# Patient Record
Sex: MALE | Race: WHITE | NOT HISPANIC OR LATINO | URBAN - METROPOLITAN AREA
[De-identification: names, ages, dates, MRNs, and addresses within clinical notes are randomized per-mention and may not be internally consistent; named-entity substitution may affect disease eponyms.]

---

## 2019-06-19 ENCOUNTER — THERAPY VISIT (OUTPATIENT)
Dept: PHYSICAL THERAPY | Facility: CLINIC | Age: 84
End: 2019-06-19
Payer: COMMERCIAL

## 2019-06-19 DIAGNOSIS — M25.561 ACUTE PAIN OF RIGHT KNEE: ICD-10-CM

## 2019-06-19 PROCEDURE — 97110 THERAPEUTIC EXERCISES: CPT | Mod: GP | Performed by: PHYSICAL THERAPIST

## 2019-06-19 PROCEDURE — 97161 PT EVAL LOW COMPLEX 20 MIN: CPT | Mod: GP | Performed by: PHYSICAL THERAPIST

## 2019-06-19 ASSESSMENT — ACTIVITIES OF DAILY LIVING (ADL)
AS_A_RESULT_OF_YOUR_KNEE_INJURY,_HOW_WOULD_YOU_RATE_YOUR_CURRENT_LEVEL_OF_DAILY_ACTIVITY?: NEARLY NORMAL
SQUAT: ACTIVITY IS SOMEWHAT DIFFICULT
RISE FROM A CHAIR: ACTIVITY IS SOMEWHAT DIFFICULT
WEAKNESS: THE SYMPTOM AFFECTS MY ACTIVITY SLIGHTLY
STAND: ACTIVITY IS SOMEWHAT DIFFICULT
KNEE_ACTIVITY_OF_DAILY_LIVING_SUM: 51
SWELLING: I DO NOT HAVE THE SYMPTOM
RAW_SCORE: 51
PAIN: I DO NOT HAVE THE SYMPTOM
HOW_WOULD_YOU_RATE_THE_CURRENT_FUNCTION_OF_YOUR_KNEE_DURING_YOUR_USUAL_DAILY_ACTIVITIES_ON_A_SCALE_FROM_0_TO_100_WITH_100_BEING_YOUR_LEVEL_OF_KNEE_FUNCTION_PRIOR_TO_YOUR_INJURY_AND_0_BEING_THE_INABILITY_TO_PERFORM_ANY_OF_YOUR_USUAL_DAILY_ACTIVITIES?: 40
WALK: ACTIVITY IS SOMEWHAT DIFFICULT
HOW_WOULD_YOU_RATE_THE_OVERALL_FUNCTION_OF_YOUR_KNEE_DURING_YOUR_USUAL_DAILY_ACTIVITIES?: ABNORMAL
GO UP STAIRS: ACTIVITY IS SOMEWHAT DIFFICULT
SIT WITH YOUR KNEE BENT: ACTIVITY IS NOT DIFFICULT
STIFFNESS: I DO NOT HAVE THE SYMPTOM
KNEEL ON THE FRONT OF YOUR KNEE: ACTIVITY IS NOT DIFFICULT
KNEE_ACTIVITY_OF_DAILY_LIVING_SCORE: 72.86
GIVING WAY, BUCKLING OR SHIFTING OF KNEE: THE SYMPTOM AFFECTS MY ACTIVITY SLIGHTLY
GO DOWN STAIRS: ACTIVITY IS FAIRLY DIFFICULT
LIMPING: THE SYMPTOM AFFECTS MY ACTIVITY SLIGHTLY

## 2019-06-19 NOTE — LETTER
[facility] ATHLETIC [facility] PHYSICAL THERAPY  [address]  [facility] [address]  [phone]    2019    Re: Americo Barnes   :   5/15/1927  MRN:  [id number]   REFERRING PHYSICIAN:   Micah Bellamy    [facility] ATHLETIC [facility] PHYSICAL THERAPY    Date of Initial Evaluation:  2019  Visits:   1  Reason for Referral:  Acute pain of right knee    [facility] Initial Evaluation -- Lower Extremity  Evaluation Date: 2019  Americo Barnes is a 89 year old male with a Lt Knee condition.   Referral: Ortho  Work mechanical stresses: NA   Employment status: Retired  Leisure mechanical stresses: Walking daily (30 min)  Functional disability score: See KOS  VAS score (0-10): 1-2/10  Patient goals/expectations:  Reduce pain and knee buckling with walking    HISTORY:  Present symptoms: (R) ant knee  Pain quality (sharp/shooting/stabbing/aching/burning/cramping):  Achy   Present since (onset date): 19    Symptoms (improving/unchaning/worsening):  Unchanging.    Symptoms commenced as a result of:  [place]   Condition occurred in the following environment: Community   Symptoms at onset: as above    Paresthesia (yes/no):  No  Spinal history: No     Cough/Sneeze (pos/neg):  Neg  Constant symptoms: None  Intermittent symptoms: As above  Symptoms are worse with the following: Always Walking, Sometimes rising from chair and first steps, Time of day - No effect  Symptoms are better with the following: Always When still  Continued use makes the pain (better/worse/no effect): No effect  Disturbed night (yes/no): No    Pain at rest (yes/no):  No    Site (back/hip/knee/ankle/foot):    Other questions (swelling/clicking/locking/giving way/falling):  buckling  Previous episodes: No  Previous treatments: None      Re: Americo Barnes   :   5/15/1927    Specific Questions:  General health  (excellent/good/fair/poor):  Excellent  Pertinent medical history includes: None  Medications (nil/NSAIDS/analg/steroids/anticoag/other):  Anticoag  Medical allergies:  None  Imaging (none/Xray/MRI/other):  X-ray  Recent or major surgery (yes/no):  None  Night pain (yes/no):  No  Accidents (yes/no):  No  Unexplained weight loss (yes/no):  No  Barriers at home: None  Other red flags: None    Sites for physical examination (back/hip/knee/ankle/foot/other): Knee    EXAMINATION    Posture:  Sitting (good/fair/poor): NT    Correction of Posture (better/worse/no effect/NA): NT  Standing (good/fair/poor): NT  Other observations:  NT  Neurological: (NA/motor/sensory/reflexes/dural): NT  Baselines (pain or functional activity): Pain/buckling with walking, sometimes rising and first steps  Extremities (Hip / Knee / Ankle / Foot): Knee  Movement Loss Phillip Mod Min Nil Pain   Flexion    X 133 deg (B); ERP Rt knee   Extension   X  Rt 2 deg lacking  Lt 0 deg   Passive Movement (+/- over pressure)/(PDM/ERP):  NT  Resisted Test Response (pain): Quad Rt 4+/5 Lt 5/5; H-S 5/5 (B)  Other Tests: NT    Spine:  Movement loss: NT  Effect of repeated movements: NT  Effect of static positioning: NT  Spine testing (not relevant/relevant/secondary problem): Not relevant                      Re: Americo Barnes   :   5/15/1927    Baseline Symptoms: ROM/strength as above; 2/10 pain squatting  Repeated Tests Symptom Response Mechanical Response   Active/Passive movement, resisted test, functional test During -  Produce, Abolish, Increase, Decrease, NE After -  Better, Worse, NB, NW, NE Effect -   ? or ? ROM, strength or key functional test No   Effect   Knee ext w/self OP (Sitting) Produce  Posterior knee  No Worse    Inc Ext ROM to 1 deg hyper    Inc Quad strength    NE squat    LAQ No Effect    No Effect    Dec pain squat    Effect of static positioning       NT       Provisional Classification (Extremity/Spine):  Extremity - Derangement     Princicple of Management:   Education:  Specificity of exercise, rationale with repeated movements    Equipment provided:  None  Exercise and dosage:  Alt between repeated knee ext w/self OP and LAQ x 10-15 reps every 2 hrs    ASSESSMENT/PLAN:  Patient is a 89 year old male with right side knee complaints.    Patient has the following significant findings with corresponding treatment plan.                Diagnosis 1:  Rt Knee Pain  Pain -  self management, education, directional preference exercise and home program  Decreased ROM/flexibility - manual therapy, therapeutic exercise and home program  Decreased joint mobility - manual therapy, therapeutic exercise and home program  Decreased strength - therapeutic exercise, therapeutic activities and home program  Impaired muscle performance - neuro re-education and home program  Decreased function - therapeutic activities and home program  Previous and current functional limitations:  (See Goal Flow Sheet for this information)    Short term and Long term goals: (See Goal Flow Sheet for this information)   Communication ability:  Patient appears to be able to clearly communicate and understand verbal and written communication and follow directions correctly.  Treatment Explanation - The following has been discussed with the patient:   RX ordered/plan of care, Anticipated outcomes, Possible risks and side effects      Re: Americo Barnes   :   5/15/1927    This patient would benefit from PT intervention to resume normal activities.   Rehab potential is good.  Frequency:  1 X week, once daily  Duration:  for 6 weeks  Discharge Plan:  Achieve all LTG.  Independent in home treatment program.  Reach maximal therapeutic benefit.    Thank you for your referral.    INQUIRIES        Therapist:   MANI MonteroT, Cert. MDT   [facility]  [address] [facility] [address]   [place] [address]  Phone: [phone]  Fax: [phone]

## 2019-06-19 NOTE — PROGRESS NOTES
[facility] Initial Evaluation -- Lower Extremity    Evaluation Date: June 19, 2019  Americo Barnes is a 89 year old male with a Lt Knee condition.   Referral: Ortho  Work mechanical stresses: NA   Employment status: Retired  Leisure mechanical stresses: Walking daily (30 min)  Functional disability score: See KOS  VAS score (0-10): 1-2/10  Patient goals/expectations:  Reduce pain and knee buckling with walking    HISTORY:    Present symptoms: (R) ant knee  Pain quality (sharp/shooting/stabbing/aching/burning/cramping):  Achy     Present since (onset date): 6/13/19    Symptoms (improving/unchaning/worsening):  Unchanging.      Symptoms commenced as a result of:  [place]   Condition occurred in the following environment: Community     Symptoms at onset: as above  Paresthesia (yes/no):  No  Spinal history: No   Cough/Sneeze (pos/neg):  Neg    Constant symptoms: None  Intermittent symptoms: As above    Symptoms are worse with the following: Always Walking, Sometimes rising from chair and first steps, Time of day - No effect  Symptoms are better with the following: Always When still    Continued use makes the pain (better/worse/no effect): No effect    Disturbed night (yes/no): No      Pain at rest (yes/no):  No  Site (back/hip/knee/ankle/foot):      Other questions (swelling/clicking/locking/giving way/falling):  buckling     Previous episodes: No  Previous treatments: None    Specific Questions:  General health (excellent/good/fair/poor):  Excellent  Pertinent medical history includes: None  Medications (nil/NSAIDS/analg/steroids/anticoag/other):  Anticoag  Medical allergies:  None  Imaging (none/Xray/MRI/other):  X-ray  Recent or major surgery (yes/no):  None  Night pain (yes/no):  No  Accidents (yes/no):  No  Unexplained weight loss (yes/no):  No  Barriers at home: None  Other red flags: None    Sites for physical examination (back/hip/knee/ankle/foot/other):  Knee    EXAMINATION    Posture:  Sitting (good/fair/poor): NT    Correction of Posture (better/worse/no effect/NA): NT  Standing (good/fair/poor): NT  Other observations:  NT    Neurological: (NA/motor/sensory/reflexes/dural): NT    Baselines (pain or functional activity): Pain/buckling with walking, sometimes rising and first steps    Extremities (Hip / Knee / Ankle / Foot): Knee    Movement Loss Phillip Mod Min Nil Pain   Flexion    X 133 deg (B); ERP Rt knee   Extension   X  Rt 2 deg lacking  Lt 0 deg     Passive Movement (+/- over pressure)/(PDM/ERP):  NT  Resisted Test Response (pain): Quad Rt 4+/5 Lt 5/5; H-S 5/5 (B)  Other Tests: NT    Spine:  Movement loss: NT  Effect of repeated movements: NT  Effect of static positioning: NT  Spine testing (not relevant/relevant/secondary problem): Not relevant    Baseline Symptoms: ROM/strength as above; 2/10 pain squatting  Repeated Tests Symptom Response Mechanical Response   Active/Passive movement, resisted test, functional test During -  Produce, Abolish, Increase, Decrease, NE After -  Better, Worse, NB, NW, NE Effect -   ? or ? ROM, strength or key functional test No   Effect   Knee ext w/self OP (Sitting) Produce  Posterior knee  No Worse    Inc Ext ROM to 1 deg hyper    Inc Quad strength    NE squat    LAQ No Effect    No Effect    Dec pain squat    Effect of static positioning       NT         Provisional Classification (Extremity/Spine):  Extremity - Derangement      Princicple of Management:   Education:  Specificity of exercise, rationale with repeated movements    Equipment provided:  None  Exercise and dosage:  Alt between repeated knee ext w/self OP and LAQ x 10-15 reps every 2 hrs    ASSESSMENT/PLAN:    Patient is a 89 year old male with right side knee complaints.    Patient has the following significant findings with corresponding treatment plan.                Diagnosis 1:  Rt Knee Pain    Pain -  self management, education, directional preference exercise  and home program  Decreased ROM/flexibility - manual therapy, therapeutic exercise and home program  Decreased joint mobility - manual therapy, therapeutic exercise and home program  Decreased strength - therapeutic exercise, therapeutic activities and home program  Impaired muscle performance - neuro re-education and home program  Decreased function - therapeutic activities and home program    Previous and current functional limitations:  (See Goal Flow Sheet for this information)    Short term and Long term goals: (See Goal Flow Sheet for this information)     Communication ability:  Patient appears to be able to clearly communicate and understand verbal and written communication and follow directions correctly.  Treatment Explanation - The following has been discussed with the patient:   RX ordered/plan of care  Anticipated outcomes  Possible risks and side effects  This patient would benefit from PT intervention to resume normal activities.   Rehab potential is good.    Frequency:  1 X week, once daily  Duration:  for 6 weeks  Discharge Plan:  Achieve all LTG.  Independent in home treatment program.  Reach maximal therapeutic benefit.    Please refer to the daily flowsheet for treatment today, total treatment time and time spent performing 1:1 timed codes.

## 2019-06-26 ENCOUNTER — THERAPY VISIT (OUTPATIENT)
Dept: PHYSICAL THERAPY | Facility: CLINIC | Age: 84
End: 2019-06-26
Payer: COMMERCIAL

## 2019-06-26 DIAGNOSIS — M25.561 ACUTE PAIN OF RIGHT KNEE: ICD-10-CM

## 2019-06-26 PROCEDURE — 97110 THERAPEUTIC EXERCISES: CPT | Mod: GP | Performed by: PHYSICAL THERAPIST

## 2019-06-26 NOTE — PROGRESS NOTES
DISCHARGE REPORT    Progress reporting period is from 6.19.19 to 6.26.19.       SUBJECTIVE  Subjective changes noted by patient:  Pt reports that knee is doing well.  Has not been having any pain or buckling since last visit.  Has been doing his regular walking without issues.  Is doing HEP ~ 3-4 x daily.    Current Pain level: 0/10.     Initial Pain level: 2/10.   Changes in function:  Yes (See Goal flowsheet attached for changes in current functional level)  Adverse reaction to treatment or activity: None    OBJECTIVE  Objective: ROM:  Rt Knee 2-0-135 deg.  Strength:  Quad 5/5; H-S 5/5.      ASSESSMENT/PLAN  Updated problem list and treatment plan:   Diagnosis 1:  Rt Knee Pain    Decreased ROM/flexibility - therapeutic exercise and home program  Decreased joint mobility - therapeutic exercise and home program  Decreased strength - therapeutic exercise and home program  Impaired muscle performance - home program  STG/LTGs have been met or progress has been made towards goals:  Yes (See Goal flow sheet completed today.)  Assessment of Progress: The patient's condition is improving.  The patient has met all of their long term goals.  Self Management Plans:  Patient is independent in a home treatment program.  Patient is independent in self management of symptoms.  I have re-evaluated this patient and find that the nature, scope, duration and intensity of the therapy is appropriate for the medical condition of the patient.  Americo continues to require the following intervention to meet STG and LTG's:  PT intervention is no longer required to meet STG/LTG.    Recommendations:  This patient is ready to be discharged from therapy and continue their home treatment program.

## 2019-06-26 NOTE — LETTER
[facility] ATHLETIC [facility]  [address]   [place] [address]  [phone]    2019    Re: Americo Barnes   :   5/15/1927  MRN:  [id number]   REFERRING PHYSICIAN:   Micah Bellamy    [facility] ATHLETIC [facility]  Date of Initial Evaluation:  2019  Visits:  Rxs Used: 2  Reason for Referral:  Acute pain of right knee    DISCHARGE REPORT  Progress reporting period is from 19 to 19.       SUBJECTIVE  Subjective changes noted by patient:  Pt reports that knee is doing well.  Has not been having any pain or buckling since last visit.  Has been doing his regular walking without issues.  Is doing HEP ~ 3-4 x daily.    Current Pain level: 0/10.     Initial Pain level: 2/10.   Changes in function:  Yes (See Goal flowsheet attached for changes in current functional level)  Adverse reaction to treatment or activity: None    OBJECTIVE  Objective: ROM:  Rt Knee 2-0-135 deg.  Strength:  Quad 5/5; H-S 5/5.    ASSESSMENT/PLAN  Updated problem list and treatment plan:   Diagnosis 1:  Rt Knee Pain    Decreased ROM/flexibility - therapeutic exercise and home program  Decreased joint mobility - therapeutic exercise and home program  Decreased strength - therapeutic exercise and home program  Impaired muscle performance - home program  STG/LTGs have been met or progress has been made towards goals:  Yes (See Goal flow sheet completed today.)  Assessment of Progress: The patient's condition is improving.  The patient has met all of their long term goals.  Self Management Plans:  Patient is independent in a home treatment program.  Patient is independent in self management of symptoms.  I have re-evaluated this patient and find that the nature, scope, duration and intensity of the therapy is appropriate for the medical condition of the patient.  Americo continues to require the following intervention to meet STG and LTG's:  PT  intervention is no longer required to meet STG/LTG.          Re: Americo OLI Cameron   :   5/15/1927    Recommendations:  This patient is ready to be discharged from therapy and continue their home treatment program.    Thank you for your referral.    INQUIRIES  Therapist: Beau Smith, PT  [facility]  [address]  [place] [address]  Phone: [phone]  Fax: [phone]

## 2020-12-19 ENCOUNTER — RECORDS - HEALTHEAST (OUTPATIENT)
Dept: LAB | Facility: CLINIC | Age: 85
End: 2020-12-19

## 2020-12-21 ENCOUNTER — OFFICE VISIT - HEALTHEAST (OUTPATIENT)
Dept: GERIATRICS | Facility: CLINIC | Age: 85
End: 2020-12-21

## 2020-12-21 ENCOUNTER — AMBULATORY - HEALTHEAST (OUTPATIENT)
Dept: GERIATRICS | Facility: CLINIC | Age: 85
End: 2020-12-21

## 2020-12-21 ENCOUNTER — RECORDS - HEALTHEAST (OUTPATIENT)
Dept: LAB | Facility: CLINIC | Age: 85
End: 2020-12-21

## 2020-12-21 ENCOUNTER — COMMUNICATION - HEALTHEAST (OUTPATIENT)
Dept: GERIATRICS | Facility: CLINIC | Age: 85
End: 2020-12-21

## 2020-12-21 DIAGNOSIS — J47.9 BRONCHIECTASIS WITHOUT COMPLICATION (H): ICD-10-CM

## 2020-12-21 DIAGNOSIS — H35.30 AMD (AGE RELATED MACULAR DEGENERATION): ICD-10-CM

## 2020-12-21 DIAGNOSIS — U07.1 2019 NOVEL CORONAVIRUS DISEASE (COVID-19): ICD-10-CM

## 2020-12-21 LAB — INR PPP: 3.76 (ref 0.9–1.1)

## 2020-12-21 RX ORDER — MULTIPLE VITAMINS W/ MINERALS TAB 9MG-400MCG
1 TAB ORAL DAILY
Status: SHIPPED | COMMUNITY
Start: 2020-12-21

## 2020-12-21 RX ORDER — ZINC SULFATE 50(220)MG
220 CAPSULE ORAL DAILY
Status: SHIPPED | COMMUNITY
Start: 2020-12-21

## 2020-12-21 RX ORDER — ALBUTEROL SULFATE 90 UG/1
2 AEROSOL, METERED RESPIRATORY (INHALATION) 4 TIMES DAILY
Status: SHIPPED | COMMUNITY
Start: 2020-12-21

## 2020-12-22 LAB — INR PPP: 3.48 (ref 0.9–1.1)

## 2020-12-23 ENCOUNTER — OFFICE VISIT - HEALTHEAST (OUTPATIENT)
Dept: GERIATRICS | Facility: CLINIC | Age: 85
End: 2020-12-23
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
## 2020-12-23 DIAGNOSIS — U07.1 PNEUMONIA DUE TO COVID-19 VIRUS: ICD-10-CM

## 2020-12-23 DIAGNOSIS — J12.82 PNEUMONIA DUE TO COVID-19 VIRUS: ICD-10-CM

## 2020-12-23 DIAGNOSIS — J96.01 ACUTE RESPIRATORY FAILURE WITH HYPOXIA (H): ICD-10-CM

## 2020-12-23 ASSESSMENT — MIFFLIN-ST. JEOR: SCORE: 1442.32

## 2020-12-28 ENCOUNTER — OFFICE VISIT - HEALTHEAST (OUTPATIENT)
Dept: GERIATRICS | Facility: CLINIC | Age: 85
End: 2020-12-28

## 2020-12-28 DIAGNOSIS — U07.1 PNEUMONIA DUE TO COVID-19 VIRUS: ICD-10-CM

## 2020-12-28 DIAGNOSIS — J12.82 PNEUMONIA DUE TO COVID-19 VIRUS: ICD-10-CM

## 2020-12-28 DIAGNOSIS — U07.1 2019 NOVEL CORONAVIRUS DISEASE (COVID-19): ICD-10-CM

## 2020-12-28 DIAGNOSIS — J96.01 ACUTE RESPIRATORY FAILURE WITH HYPOXIA (H): ICD-10-CM

## 2020-12-30 ENCOUNTER — OFFICE VISIT - HEALTHEAST (OUTPATIENT)
Dept: GERIATRICS | Facility: CLINIC | Age: 85
End: 2020-12-30

## 2020-12-30 DIAGNOSIS — J96.01 ACUTE RESPIRATORY FAILURE WITH HYPOXIA (H): ICD-10-CM

## 2020-12-30 DIAGNOSIS — U07.1 PNEUMONIA DUE TO COVID-19 VIRUS: ICD-10-CM

## 2020-12-30 DIAGNOSIS — J12.82 PNEUMONIA DUE TO COVID-19 VIRUS: ICD-10-CM

## 2020-12-30 ASSESSMENT — MIFFLIN-ST. JEOR: SCORE: 1442.32

## 2021-01-04 ENCOUNTER — AMBULATORY - HEALTHEAST (OUTPATIENT)
Dept: GERIATRICS | Facility: CLINIC | Age: 86
End: 2021-01-04

## 2021-06-05 VITALS
BODY MASS INDEX: 26.51 KG/M2 | DIASTOLIC BLOOD PRESSURE: 67 MMHG | OXYGEN SATURATION: 96 % | HEART RATE: 74 BPM | HEIGHT: 69 IN | TEMPERATURE: 98.8 F | WEIGHT: 179 LBS | SYSTOLIC BLOOD PRESSURE: 121 MMHG | RESPIRATION RATE: 20 BRPM

## 2021-06-05 VITALS
OXYGEN SATURATION: 93 % | HEIGHT: 69 IN | DIASTOLIC BLOOD PRESSURE: 80 MMHG | RESPIRATION RATE: 18 BRPM | WEIGHT: 179 LBS | HEART RATE: 66 BPM | SYSTOLIC BLOOD PRESSURE: 142 MMHG | BODY MASS INDEX: 26.51 KG/M2 | TEMPERATURE: 97.8 F

## 2021-06-14 NOTE — PROGRESS NOTES
[facility]    Facility:   [facility] [[id number]]   Code Status: POLST AVAILABLE      CHIEF COMPLAINT/REASON FOR VISIT:  Chief Complaint   Patient presents with     Review Of Multiple Medical Conditions     hypoxia, covid-19, fall, weakness, long term use of anticoagulants, pneumonia due to covid 19, DVT       HISTORY:      HPI: Eliu is a 89 y.o. 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 Americo, as he likes to be called, was recently admitted to [facility] from 12/15/2020 until 12/19/2020 for pneumonia due to COVID-19, acute hypoxemia secondary to COVID-19, falls, generalized weakness.  The discharging provider summarized the hospitalization stating that Mr. Barnes tested positive for COVID-19 on 12/14 with symptoms starting on 12/12.  He had superimposed pneumonia, CHF, PE.  Infectious diseases placed him on remdesivir, he started dexamethasone and 4 times daily albuterol on 12/15.  He was started on oxygen as  needed to keep saturation greater than 90% and was requiring 1 to 2 L of oxygen while in the hospital.  He was stabilized and transferred to [facility] for acute rehabilitation.    Today Mr. Barnes is being evaluated for a routine review of multiple medical problems while in the TCU.  He has been continuing to use oxygen but nurses admit that they have not really attempted to wean at this time.  Did discuss weaning oxygen as much as possible so that he can be at baseline prior to discharge.  He states that therapies are going well.  He states that he just wants to go home.  He states that he feels he is strong enough and ready to discharge.  Did discuss that we would likely keep him for a bit longer so as to ensure that he is not on oxygen and that he truly is improving.  He is agreeable to this care plan.  He states that he has no new concerns or issues to report.  He states that he has no aches or pains.  Nursing staff feel that he has been stable.  Americo denies any other concerns including fevers/chills, cough or cold symptoms, headaches, vision changes, chest pain/pressure, difficulty breathing, SOB, abdominal pain, nausea, vomiting, diarrhea, dysuria, increasing weakness, increasing pain.     Past Medical History:   Diagnosis Date     Adenomatous polyp of colon 10/30/2018     After-cataract obscuring vision 08/29/2014     Bronchiectasis without complication (H) 9/6/2018     Chorioretinal scar 8/10/2010    Epic     Congenital pes planus 1/17/2006     Disorder of bursae and tendons in shoulder region 11/29/2005    Epic     DVT (deep vein thrombosis) in pregnancy      DVT (deep venous thrombosis) (H) 11/24/2009     Epiretinal membrane (ERM) of left eye 12/22/2015     Eversion of lacrimal punctum 1/23/2009    Epic     Factor V Leiden (H) 12/21/2020     Fibula fracture 2/14/2008     Hypertropia of right eye 12/7/2017     Lamellar macular hole of left eye 12/22/2015     Lung nodule 9/25/2007     Pneumonia  due to COVID-19 virus 12/19/2020     Post herpetic neuralgia 4/30/2018     Prostate cancer (H) 12/21/2020     Pseudophakia, both eyes 7/22/2010    OD 7/12/10  OS 7/26/10  Dr. Hadley Hancock     Raynaud's phenomenon 1/17/2006     Senile nuclear sclerosis 01/23/2009     Sprained ankle 2/5/2008     Superior oblique palsy, right 3/28/2018     Warfarin anticoagulation 11/25/2009    Diagnoses: DVT x2, hx Factor V Leiden Therapeutic range: 2 - 3.  Warfarin pill strength: 5mg.  Initiation date ( AKA date when started on warfarin ): 11/24/2009  On enoxaparin: Yes, untill INR is theraputic  Length of treatment: long-term.  Comments:   Nikhil Howe             Family History   Problem Relation Age of Onset     Breast cancer Mother      Cancer Father      Colon cancer Brother      Prostate cancer Brother      Social History     Socioeconomic History     Marital status:      Spouse name: Not on file     Number of children: Not on file     Years of education: Not on file     Highest education level: Not on file   Occupational History     Not on file   Social Needs     Financial resource strain: Not on file     Food insecurity     Worry: Not on file     Inability: Not on file     Transportation needs     Medical: Not on file     Non-medical: Not on file   Tobacco Use     Smoking status: Never Smoker   Substance and Sexual Activity     Alcohol use: Not Currently     Frequency: Never     Comment: occasional     Drug use: Not on file     Sexual activity: Not on file   Lifestyle     Physical activity     Days per week: Not on file     Minutes per session: Not on file     Stress: Not on file   Relationships     Social connections     Talks on phone: Not on file     Gets together: Not on file     Attends Jew service: Not on file     Active member of club or organization: Not on file     Attends meetings of clubs or organizations: Not on file     Relationship status: Not on file     Intimate partner violence     Fear of  "current or ex partner: Not on file     Emotionally abused: Not on file     Physically abused: Not on file     Forced sexual activity: Not on file   Other Topics Concern     Incontinent Not Asked     Toileting independently Not Asked     Cognitive impairment Not Asked     Vision impairment Not Asked     Hearing impairment Not Asked     Dentures Not Asked   Social History Narrative     Not on file       REVIEW OF SYSTEM:  Per HPI    PHYSICAL EXAM:   /67   Pulse 74   Temp 98.8  F (37.1  C)   Resp 20   Ht 5' 9\" (1.753 m)   Wt 179 lb (81.2 kg)   SpO2 96%   BMI 26.43 kg/m      A limited exam was performed due to recommendations for care during COVID-19 pandemic. Due to the 2020 COVID-19 pandemic, except as noted the patient was visually observed at a 6 foot plus distance (so for billing this means that if the physical exam requires touch such as a cardiovascular exam or more complete respiratory exam this would be the \"exception\").  A mostly or purely observational exam depending on the charting below was performed in an effort to keep patient safe from COVID-19 and other communicable diseases.     General appearance: alert, appears stated age and cooperative  HEENT: Head is normocephalic with normal hair distribution. No evidence of trauma. Ears: Without lesions or deformity. No acute purulent discharge. Eyes: Conjunctivae pink with no scleral icterus or erythema. Nose: Normal. Oropharnyx: mmm.  Lungs: respirations without effort, using oxygen.  Extremities: extremities normal, atraumatic, no cyanosis.  Skin: Skin color, texture normal. No rashes or lesions on exposed skin.   Neurologic: Grossly normal   Psych: interacts well with caregivers, exhibits logical thought processes and connections, pleasant.      LABS:   None today.     ASSESSMENT:      ICD-10-CM    1. Pneumonia due to COVID-19 virus  U07.1     J12.89    2. Acute respiratory failure with hypoxia (H)  J96.01    3. Fall, subsequent encounter  " W19.XXXD        PLAN:    COVID-19 Infection  -COVID-19 PCR positive.   -Albuterol 2 puffs with chamber every 4 hours as needed for shortness of breath or wheeze.   -Eliquis 5 mg by mouth two times a day (this is ongoing d  -Tylenol 650 mg by mouth four times a day for fever, pain as needed.   -Vitamin D3 5000 iu by mouth daily.   -Zince 220 mg by mouth daily.   -Oxygen as needed, plan to wean as much as possible.  -Quarantine.   -Follow very closely due to serious nature of disease and patient's advanced age and comorbidities.    Physical Deconditioning  -Continue PT/OT and other therapies as per care plan.  -Encouraged good nutrition and movement habits.   -Discussed care plan and expected course of stay.   -Continue to follow-up per routine schedule or sooner if needed.     Otherwise continue current care plan for all other chronic medical conditions, as they are stable. Encouraged patient to engage in healthy lifestyle behaviors such as engaging in social activities, exercising (PT/OT), eating well, and following care plan. Follow up for routine check-up, or sooner if needed. Will continue to monitor patient and work with nursing staff collaboratively to work toward positive patient outcomes.    Electronically signed by: Xiomara Fonseca CNP

## 2021-06-14 NOTE — PROGRESS NOTES
[facility]    Facility:   [facility] [[id number]]   Code Status: POLST AVAILABLE    Admission evaluation to TCU quarantine unit of 89-year-old male.  History is taken from hospital notes, patient is able to provide minimal history.  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    Past medical history, current medical problem list, drug allergies, current medication list, social history, family history, CODE STATUS reviewed in epic.    Review of systems: Continued sense of dyspnea, moderate cough.  No sputum production.  Denies fever sweats or chills.  Poor appetite continues.  No nausea or vomiting.  No active GI or  symptoms.  Irritable.  No pleuritic chest discomfort.  Remainder of 12 point review of systems obtained negative.    Exam: Sitting in chair, hard of hearing, participates in history taking.  Irritable.  Blood pressure 121/67, heart rate 74, temperature 98.8, O2 96% on 2 L.  No facial asymmetry.  Mucous membranes moist.  No use of accessory muscles at rest.  S1 and S2 regular.  Pulmonary exam with diminished air movement bases, no rales or wheezes.  Periphery with trace nonpitting edema, venous stasis changes present.  Muscle tone diminished.    Impression and plan:   COVID-19 with pneumonia post remdesivir and dexamethasone, continues to require supplemental O2,  continue to observe, inhalers, quarantine to continue, afebrile.    Significant deconditioning with need for rehabilitation.    Vision deficit with macular degeneration.    Chronic DVT on Coumadin to be continued.    Recent ER evaluation with cholelithiasis, asymptomatic, no evidence of acute abdomen.    Chronic bronchiectasis, not on oxygen in home setting.    Severe malnutrition with need for nutritional support.    CT findings of thoracic fracture subacute, no current complaints of pain.    Medical records are reviewed, review of medications, discussion with patient and nursing staff, clinical review of patient.      Electronically signed by: Jeanie Montez MD

## 2021-06-14 NOTE — TELEPHONE ENCOUNTER
[facility] Nurse Triage Anticoagulation Note      Provider: JAYANT Josue  Facility: [facility]    Facility Type: TCU    Caller: Rhonda  Call Back Number:  [phone]    Reason for call: INR    Today s INR: 3.76  Previous INR:  Unknown, new admit from [facility].    Diagnosis/Goal: DVT  Heparin/Lovenox: No   Currently on ABX: No  Other interacting Medications: None  Missed or refused doses: No    Verbal Order/Direction given by Provider: Hold tonight, check INR in am.    Provider giving order: JAYANT Josue    Verbal order given to: Rhonda Austin RN   
26.1

## 2021-06-14 NOTE — PROGRESS NOTES
[facility]    Facility:   [facility] [[id number]]   Code Status: POLST AVAILABLE  PCP: Nikhil Howe MD   Phone: [phone]   Fax: [phone]      CHIEF COMPLAINT/REASON FOR VISIT:  Chief Complaint   Patient presents with     Discharge Summary       HISTORY COURSE:  Eliu is a 89 y.o. 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   Americo, as he likes to be called, was recently admitted to [facility] from 12/15/2020 until 12/19/2020 for pneumonia due to COVID-19, acute hypoxemia secondary to COVID-19, falls, generalized weakness.  The discharging provider summarized the hospitalization stating that Mr. Barnes tested positive for COVID-19 on 12/14 with symptoms starting on 12/12.  He had superimposed pneumonia, CHF, PE.  Infectious diseases placed him on remdesivir, he started dexamethasone and 4 times daily albuterol on 12/15.  He was started on oxygen as needed to keep saturation greater than 90% and was  "requiring 1 to 2 L of oxygen while in the hospital.  He was stabilized and transferred to [facility] for acute rehabilitation.    Today Mr. Barnes is being evaluated for a discharge from the TCU. He shares that he has been doing remarkably well. He has no new issues or problems to report. He is very happy to be going home. He has been eating, drinking and eliminating well. He has not had any medication changes while in the TCU. He was successfully weaned from oxygen. Americo has completed therapies without an issue. Americo denies any other concerns including fevers/chills, cough or cold symptoms, headaches, vision changes, chest pain/pressure, difficulty breathing, SOB, abdominal pain, nausea, vomiting, diarrhea, dysuria, increasing weakness, increasing pain.     PHYSICAL EXAM:   /80   Pulse 66   Temp 97.8  F (36.6  C)   Resp 18   Ht 5' 9\" (1.753 m)   Wt 179 lb (81.2 kg)   SpO2 93%   BMI 26.43 kg/m      A limited exam was performed due to recommendations for care during COVID-19 pandemic. Due to the 2020 COVID-19 pandemic, except as noted the patient was visually observed at a 6 foot plus distance (so for billing this means that if the physical exam requires touch such as a cardiovascular exam or more complete respiratory exam this would be the \"exception\").  A mostly or purely observational exam depending on the charting below was performed in an effort to keep patient safe from COVID-19 and other communicable diseases.     General appearance: alert, appears stated age and cooperative  HEENT: Head is normocephalic with normal hair distribution. No evidence of trauma. Ears: Without lesions or deformity. No acute purulent discharge. Eyes: Conjunctivae pink with no scleral icterus or erythema. Nose: Normal. Oropharnyx: mmm.  Lungs: respirations without effort.  Extremities: extremities normal, atraumatic, no cyanosis.  Skin: Skin color, texture normal. No rashes or lesions on exposed skin. "   Neurologic: Grossly normal   Psych: interacts well with caregivers, exhibits logical thought processes and connections, pleasant.    MEDICATION LIST:  Current Outpatient Medications   Medication Sig     albuterol (PROAIR HFA;PROVENTIL HFA;VENTOLIN HFA) 90 mcg/actuation inhaler Inhale 2 puffs 4 (four) times a day.     lactose-reduced food (BOOST PLUS ORAL) Take 1 Can by mouth 2 (two) times a day.     LUTEIN ORAL Take 1 tablet by mouth daily.     multivitamin with minerals (THERA-M) 9 mg iron-400 mcg Tab tablet Take 1 tablet by mouth daily.     warfarin sodium (WARFARIN DAILY DOSE) by Other route.     warfarin sodium (WARFARIN ORAL) Take by mouth daily after supper. 12/21/20 INR 3.76 Hold today, check INR in am.     zinc sulfate (ZINC-220) 220 (50) mg capsule Take 220 mg by mouth daily.       DISCHARGE DIAGNOSIS:    ICD-10-CM    1. Acute respiratory failure with hypoxia (H)  J96.01    2. Pneumonia due to COVID-19 virus  U07.1     J12.89      COVID-19 Infection  -COVID-19 PCR positive.   -Albuterol 2 puffs with chamber every 4 hours as needed for shortness of breath or wheeze.   -Eliquis 5 mg by mouth two times a day (this is ongoing d  -Tylenol 650 mg by mouth four times a day for fever, pain as needed.   -Vitamin D3 5000 iu by mouth daily.   -Zince 220 mg by mouth daily.   -Oxygen as needed, plan to wean as much as possible.  -Quarantine.   -Follow very closely due to serious nature of disease and patient's advanced age and comorbidities.    Physical Deconditioning  -Continue PT/OT and other therapies as per care plan.  -Encouraged good nutrition and movement habits.   -Discussed care plan and expected course of stay.   -Continue to follow-up per routine schedule or sooner if needed.     Otherwise continue current care plan for all other chronic medical conditions, as they are stable. Encouraged patient to engage in healthy lifestyle behaviors such as engaging in social activities, exercising (PT/OT), eating well,  and following care plan. Follow up for routine check-up, or sooner if needed. Will continue to monitor patient and work with nursing staff collaboratively to work toward positive patient outcomes.    MEDICAL EQUIPMENT NEEDS:  None.     DISCHARGE PLAN/FACE TO FACE:  I certify that services are/were furnished while this patient was under the care of a physician and that a physician or an allowed non-physician practitioner (NPP), had a face-to-face encounter that meets the physician face-to-face encounter requirements. The encounter was in whole, or in part, related to the primary reason for home health. The patient is confined to his/her home and needs intermittent skilled nursing, physical therapy, speech-language pathology, or the continued need for occupational therapy. A plan of care has been established by a physician and is periodically reviewed by a physician.  Date of Face-to-Face Encounter: 12/30/20    I certify that, based on my findings, the following services are medically necessary home health services: home health aid for bathing and ADLs, skilled nursing (RN) for medication set-up and teaching, symptoms and disease process monitoring and education, PT for strengthening, balance, endurance and safety within the home, OT for strengthening, ADL needs, adaptive equipment and safety.    My clinical findings support the need for the above skilled services because: home health aid for bathing and ADLs, skilled nursing (RN) for medication set-up and teaching, symptoms and disease process monitoring and education, PT for strengthening, balance, endurance and safety within the home, OT for strengthening, ADL needs, adaptive equipment and safety.    This patient is homebound because: he is a frail elderly gentleman with multiple comorbidities and recent hospitalizations making it unsafe for him to go out into the community for services--especially during current COVID-19 pandemic.     The patient is, or has been,  under my care and I have initiated the establishment of the plan of care. This patient will be followed by a physician who will periodically review the plan of care. Schedule follow up visit with primary care provider within 7 days to reestablish care.    Total unit/floor time of 35 minutes time spent on discharge planning, discharge follow-up discussion and discharge medication review.    Electronically signed by: Xiomara Fonseca CNP

## 2021-06-14 NOTE — PROGRESS NOTES
[facility]    Facility:   [facility] [[id number]]   Code Status: POLST AVAILABLE    Reevaluation of 89-year-old male hospitalized 12/15-12/19 with COVID-19 pneumonia and acute hypoxic respiratory failure, required supplemental O2, treated with remdesivir and dexamethasone, stabilized and transferred to quarantine TCU unit, originally requiring supplemental O2, now off 02.  History significant for decreased vision, chronic recurrent DVT on Coumadin, bronchiectasis.    Review of systems: Successfully off O2.  States he feels great.  Denies cough or sputum production.  Minimal fatigue.  No fever sweats or chills.  No anterior chest discomfort.  No focal neurologic deficits.  Anxious to return to home setting.  Remainder of 12 point review of systems obtained negative.    Exam: Blood pressure 128/79, O2 saturation 93% on room air, respiratory 14, temperature 98.7.  Exam is visual in view of current viral pandemic.  No use of accessory muscles at rest.  Mucous membranes moist.  Hard of hearing.  Moves all extremities without difficulty.  No evidence of respiratory distress.  Trace venous stasis changes bilateral lower extremities.    Impression and plan:   Status post hospitalization with COVID-19 and pneumonia, acute hypoxic respiratory failure resolved, continues albuterol, strength gradually improving, remains afebrile and with satisfactory oxygenation on room air.    Chronic DVT on Coumadin which has been changed to Eliquis 2.5 mg BID, no pleuritic chest discomfort, nothing to suggest acute DVT.    Chronic bronchiectasis at baseline, typically asymptomatic.    Malnutrition, appetite adequate, caloric and protein intake adequate.      Electronically signed by: Jeanie Montez MD

## 2021-06-21 NOTE — LETTER
Letter by Jeanie Montez MD at      Author: Jeanie Montez MD Service: -- Author Type: --    Filed:  Encounter Date: 12/28/2020 Status: (Other)         Patient: Eliu Barnes   MR Number: [id number]   YOB: 1927   Date of Visit: 12/28/2020     [facility]    Facility:   [facility] [[id number]]   Code Status: POLST AVAILABLE    Reevaluation of 89-year-old male hospitalized 12/15-12/19 with COVID-19 pneumonia and acute hypoxic respiratory failure, required supplemental O2, treated with remdesivir and dexamethasone, stabilized and transferred to quarantine TCU unit, originally requiring supplemental O2, now off 02.  History significant for decreased vision, chronic recurrent DVT on Coumadin, bronchiectasis.    Review of systems: Successfully off O2.  States he feels great.  Denies cough or sputum production.  Minimal fatigue.  No fever sweats or chills.  No anterior chest discomfort.  No focal neurologic deficits.  Anxious to return to home setting.  Remainder of 12 point review of systems obtained negative.    Exam: Blood pressure 128/79, O2 saturation 93% on room air, respiratory 14, temperature 98.7.  Exam is visual in view of current viral pandemic.  No use of accessory muscles at rest.  Mucous membranes moist.  Hard of hearing.  Moves all extremities without difficulty.  No evidence of respiratory distress.  Trace venous stasis changes bilateral lower extremities.    Impression and plan:   Status post hospitalization with COVID-19 and pneumonia, acute hypoxic respiratory failure resolved, continues albuterol, strength gradually improving, remains afebrile and with satisfactory oxygenation on room air.    Chronic DVT on Coumadin which has been changed to Eliquis 2.5 mg BID, no pleuritic chest discomfort, nothing to suggest acute DVT.    Chronic bronchiectasis at baseline, typically asymptomatic.    Malnutrition, appetite adequate, caloric  and protein intake adequate.      Electronically signed by: Jeanie Montez MD

## 2021-06-21 NOTE — LETTER
Letter by Xiomara Fonseca CNP at      Author: Xiomara Fonseca CNP Service: -- Author Type: --    Filed:  Encounter Date: 12/30/2020 Status: (Other)         [facility]  [address] [address]                                  January 3, 2021    Patient: Eliu Barnes   MR Number: [id number]   YOB: 1927   Date of Visit: 12/30/2020     Dear Dr. Jett:    Thank you for referring Eliu Barnes to me for evaluation. Below are the relevant portions of my assessment and plan of care.    If you have questions, please do not hesitate to call me. I look forward to following Eliu along with you.    Sincerely,        Xiomara Fonseca CNP          CC  No Recipients  Xiomara Fonseca CNP  1/3/2021  8:08 PM  [facility]    Facility:   [facility] [[id number]]   Code Status: POLST AVAILABLE  PCP: Nikhil Howe MD   Phone: [phone]   Fax: [phone]      CHIEF COMPLAINT/REASON FOR VISIT:  Chief Complaint   Patient presents with   ? Discharge Summary       HISTORY COURSE:  Eliu is a 89 y.o. 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   Americo, as he likes to be called, was recently admitted to [facility] from 12/15/2020 until 12/19/2020 for pneumonia due to COVID-19, acute hypoxemia secondary to COVID-19, falls, generalized weakness.  The discharging provider summarized the hospitalization stating that Mr. Barnes tested positive for COVID-19 on 12/14 with symptoms starting on 12/12.  He had superimposed pneumonia, CHF, PE.  Infectious diseases placed him on remdesivir, he started dexamethasone and 4 times daily albuterol on 12/15.  He was started on oxygen as needed to keep saturation greater than 90% and was requiring 1 to 2 L of oxygen while in the hospital.  He was stabilized and transferred to [facility] for acute rehabilitation.    Today Mr. Barnes is being evaluated for a discharge from the TCU. He shares that he has been doing remarkably well. He has no new issues or problems to report. He is very happy to be going home. He has been eating, drinking and eliminating well. He has not had any medication changes while in the TCU. He was successfully weaned from oxygen. Americo has completed therapies without an issue. Americo denies any other concerns including fevers/chills, cough or cold symptoms, headaches, vision changes, chest pain/pressure, difficulty breathing, SOB, abdominal pain, nausea, vomiting, diarrhea, dysuria, increasing weakness, increasing pain.     PHYSICAL EXAM:   /80   Pulse 66   Temp 97.8  F (36.6  C)   Resp 18   Ht 5' 9\" (1.753 m)   Wt 179 lb (81.2 kg)   SpO2 93%   BMI 26.43 kg/m      A limited exam was performed due to recommendations for care during COVID-19 pandemic. Due to the 2020 COVID-19 pandemic, except as noted the patient was visually observed at a 6 foot plus distance (so for " "billing this means that if the physical exam requires touch such as a cardiovascular exam or more complete respiratory exam this would be the \"exception\").  A mostly or purely observational exam depending on the charting below was performed in an effort to keep patient safe from COVID-19 and other communicable diseases.     General appearance: alert, appears stated age and cooperative  HEENT: Head is normocephalic with normal hair distribution. No evidence of trauma. Ears: Without lesions or deformity. No acute purulent discharge. Eyes: Conjunctivae pink with no scleral icterus or erythema. Nose: Normal. Oropharnyx: mmm.  Lungs: respirations without effort.  Extremities: extremities normal, atraumatic, no cyanosis.  Skin: Skin color, texture normal. No rashes or lesions on exposed skin.   Neurologic: Grossly normal   Psych: interacts well with caregivers, exhibits logical thought processes and connections, pleasant.    MEDICATION LIST:  Current Outpatient Medications   Medication Sig   ? albuterol (PROAIR HFA;PROVENTIL HFA;VENTOLIN HFA) 90 mcg/actuation inhaler Inhale 2 puffs 4 (four) times a day.   ? lactose-reduced food (BOOST PLUS ORAL) Take 1 Can by mouth 2 (two) times a day.   ? LUTEIN ORAL Take 1 tablet by mouth daily.   ? multivitamin with minerals (THERA-M) 9 mg iron-400 mcg Tab tablet Take 1 tablet by mouth daily.   ? warfarin sodium (WARFARIN DAILY DOSE) by Other route.   ? warfarin sodium (WARFARIN ORAL) Take by mouth daily after supper. 12/21/20 INR 3.76 Hold today, check INR in am.   ? zinc sulfate (ZINC-220) 220 (50) mg capsule Take 220 mg by mouth daily.       DISCHARGE DIAGNOSIS:    ICD-10-CM    1. Acute respiratory failure with hypoxia (H)  J96.01    2. Pneumonia due to COVID-19 virus  U07.1     J12.89      COVID-19 Infection  -COVID-19 PCR positive.   -Albuterol 2 puffs with chamber every 4 hours as needed for shortness of breath or wheeze.   -Eliquis 5 mg by mouth two times a day (this is ongoing " d  -Tylenol 650 mg by mouth four times a day for fever, pain as needed.   -Vitamin D3 5000 iu by mouth daily.   -Zince 220 mg by mouth daily.   -Oxygen as needed, plan to wean as much as possible.  -Quarantine.   -Follow very closely due to serious nature of disease and patient's advanced age and comorbidities.    Physical Deconditioning  -Continue PT/OT and other therapies as per care plan.  -Encouraged good nutrition and movement habits.   -Discussed care plan and expected course of stay.   -Continue to follow-up per routine schedule or sooner if needed.     Otherwise continue current care plan for all other chronic medical conditions, as they are stable. Encouraged patient to engage in healthy lifestyle behaviors such as engaging in social activities, exercising (PT/OT), eating well, and following care plan. Follow up for routine check-up, or sooner if needed. Will continue to monitor patient and work with nursing staff collaboratively to work toward positive patient outcomes.    MEDICAL EQUIPMENT NEEDS:  None.     DISCHARGE PLAN/FACE TO FACE:  I certify that services are/were furnished while this patient was under the care of a physician and that a physician or an allowed non-physician practitioner (NPP), had a face-to-face encounter that meets the physician face-to-face encounter requirements. The encounter was in whole, or in part, related to the primary reason for home health. The patient is confined to his/her home and needs intermittent skilled nursing, physical therapy, speech-language pathology, or the continued need for occupational therapy. A plan of care has been established by a physician and is periodically reviewed by a physician.  Date of Face-to-Face Encounter: 12/30/20    I certify that, based on my findings, the following services are medically necessary home health services: home health aid for bathing and ADLs, skilled nursing (RN) for medication set-up and teaching, symptoms and disease  process monitoring and education, PT for strengthening, balance, endurance and safety within the home, OT for strengthening, ADL needs, adaptive equipment and safety.    My clinical findings support the need for the above skilled services because: home health aid for bathing and ADLs, skilled nursing (RN) for medication set-up and teaching, symptoms and disease process monitoring and education, PT for strengthening, balance, endurance and safety within the home, OT for strengthening, ADL needs, adaptive equipment and safety.    This patient is homebound because: he is a frail elderly gentleman with multiple comorbidities and recent hospitalizations making it unsafe for him to go out into the community for services--especially during current COVID-19 pandemic.     The patient is, or has been, under my care and I have initiated the establishment of the plan of care. This patient will be followed by a physician who will periodically review the plan of care. Schedule follow up visit with primary care provider within 7 days to reestablish care.    Total unit/floor time of 35 minutes time spent on discharge planning, discharge follow-up discussion and discharge medication review.    Electronically signed by: Xiomara Fonseca CNP

## 2021-06-21 NOTE — LETTER
Letter by Xiomara Fonseca CNP at      Author: Xiomara Fonseca CNP Service: -- Author Type: --    Filed:  Encounter Date: 12/23/2020 Status: (Other)         [facility]  [address] [address]                                  December 28, 2020    Patient: Eliu Barnes   MR Number: [id number]   YOB: 1927   Date of Visit: 12/23/2020     Dear Dr. Jett:    Thank you for referring Eliu Barnes to me for evaluation. Below are the relevant portions of my assessment and plan of care.    If you have questions, please do not hesitate to call me. I look forward to following Eliu along with you.    Sincerely,        Xiomara Fonseca CNP          CC  No Recipients  Xiomara Fonseca CNP  12/28/2020  5:45 PM  Signed  [facility]    Facility:   [facility] [[id number]]   Code Status: POLST AVAILABLE      CHIEF COMPLAINT/REASON FOR VISIT:  Chief Complaint   Patient presents with   ? Review Of Multiple Medical Conditions     hypoxia, covid-19, fall, weakness, long term use of anticoagulants, pneumonia due to covid 19, DVT       HISTORY:      HPI: Eliu is a 89 y.o. 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 Americo, as he likes to be called, was recently admitted to [facility] from 12/15/2020 until 12/19/2020 for pneumonia due to COVID-19, acute hypoxemia secondary to COVID-19, falls, generalized weakness.  The discharging provider summarized the hospitalization stating that Mr. Barnes tested positive for COVID-19 on 12/14 with symptoms starting on 12/12.  He had superimposed pneumonia, CHF, PE.  Infectious diseases placed him on remdesivir, he started dexamethasone and 4 times daily albuterol on 12/15.  He was started on oxygen as needed to keep saturation greater than 90% and was requiring 1 to 2 L of oxygen while in the hospital.  He was stabilized and transferred to [facility] TCU for acute rehabilitation.    Today Mr. Barnes is being evaluated for a routine review of multiple medical problems while in the TCU.  He has been continuing to use oxygen but nurses admit that they have not really attempted to wean at this time.  Did discuss weaning oxygen as much as possible so that he can be at baseline prior to discharge.  He states that therapies are going well.  He states that he just wants to go home.  He states that he feels he is strong enough and ready to discharge.  Did discuss that we would likely keep him for a bit longer so as to ensure that he is not on oxygen and that he truly is improving.  He is agreeable to this care plan.  He states that he has no new concerns or issues to report.  He states that he has no aches or pains.  Nursing staff feel that he has been stable.  Americo denies any other concerns including fevers/chills, cough or cold symptoms, headaches, vision changes, chest pain/pressure, difficulty breathing, SOB,  abdominal pain, nausea, vomiting, diarrhea, dysuria, increasing weakness, increasing pain.     Past Medical History:   Diagnosis Date   ? Adenomatous polyp of colon 10/30/2018   ? After-cataract obscuring vision 08/29/2014   ? Bronchiectasis without complication (H) 9/6/2018   ? Chorioretinal scar 8/10/2010    Epic   ? Congenital pes planus 1/17/2006   ? Disorder of bursae and tendons in shoulder region 11/29/2005    Epic   ? DVT (deep vein thrombosis) in pregnancy    ? DVT (deep venous thrombosis) (H) 11/24/2009   ? Epiretinal membrane (ERM) of left eye 12/22/2015   ? Eversion of lacrimal punctum 1/23/2009    Epic   ? Factor V Leiden (H) 12/21/2020   ? Fibula fracture 2/14/2008   ? Hypertropia of right eye 12/7/2017   ? Lamellar macular hole of left eye 12/22/2015   ? Lung nodule 9/25/2007   ? Pneumonia due to COVID-19 virus 12/19/2020   ? Post herpetic neuralgia 4/30/2018   ? Prostate cancer (H) 12/21/2020   ? Pseudophakia, both eyes 7/22/2010    OD 7/12/10  OS 7/26/10  Dr. Hadley Hancock   ? Raynaud's phenomenon 1/17/2006   ? Senile nuclear sclerosis 01/23/2009   ? Sprained ankle 2/5/2008   ? Superior oblique palsy, right 3/28/2018   ? Warfarin anticoagulation 11/25/2009    Diagnoses: DVT x2, hx Factor V Leiden Therapeutic range: 2 - 3.  Warfarin pill strength: 5mg.  Initiation date ( AKA date when started on warfarin ): 11/24/2009  On enoxaparin: Yes, untill INR is theraputic  Length of treatment: long-term.  Comments:   Nikhil Howe             Family History   Problem Relation Age of Onset   ? Breast cancer Mother    ? Cancer Father    ? Colon cancer Brother    ? Prostate cancer Brother      Social History     Socioeconomic History   ? Marital status:      Spouse name: Not on file   ? Number of children: Not on file   ? Years of education: Not on file   ? Highest education level: Not on file   Occupational History   ? Not on file   Social Needs   ? Financial resource strain: Not on file   ? Food  "insecurity     Worry: Not on file     Inability: Not on file   ? Transportation needs     Medical: Not on file     Non-medical: Not on file   Tobacco Use   ? Smoking status: Never Smoker   Substance and Sexual Activity   ? Alcohol use: Not Currently     Frequency: Never     Comment: occasional   ? Drug use: Not on file   ? Sexual activity: Not on file   Lifestyle   ? Physical activity     Days per week: Not on file     Minutes per session: Not on file   ? Stress: Not on file   Relationships   ? Social connections     Talks on phone: Not on file     Gets together: Not on file     Attends [employer] service: Not on file     Active member of club or organization: Not on file     Attends meetings of clubs or organizations: Not on file     Relationship status: Not on file   ? Intimate partner violence     Fear of current or ex partner: Not on file     Emotionally abused: Not on file     Physically abused: Not on file     Forced sexual activity: Not on file   Other Topics Concern   ? Incontinent Not Asked   ? Toileting independently Not Asked   ? Cognitive impairment Not Asked   ? Vision impairment Not Asked   ? Hearing impairment Not Asked   ? Dentures Not Asked   Social History Narrative   ? Not on file       REVIEW OF SYSTEM:  Per HPI    PHYSICAL EXAM:   /67   Pulse 74   Temp 98.8  F (37.1  C)   Resp 20   Ht 5' 9\" (1.753 m)   Wt 179 lb (81.2 kg)   SpO2 96%   BMI 26.43 kg/m      A limited exam was performed due to recommendations for care during COVID-19 pandemic. Due to the 2020 COVID-19 pandemic, except as noted the patient was visually observed at a 6 foot plus distance (so for billing this means that if the physical exam requires touch such as a cardiovascular exam or more complete respiratory exam this would be the \"exception\").  A mostly or purely observational exam depending on the charting below was performed in an effort to keep patient safe from COVID-19 and other communicable diseases.     General " appearance: alert, appears stated age and cooperative  HEENT: Head is normocephalic with normal hair distribution. No evidence of trauma. Ears: Without lesions or deformity. No acute purulent discharge. Eyes: Conjunctivae pink with no scleral icterus or erythema. Nose: Normal. Oropharnyx: mmm.  Lungs: respirations without effort, using oxygen.  Extremities: extremities normal, atraumatic, no cyanosis.  Skin: Skin color, texture normal. No rashes or lesions on exposed skin.   Neurologic: Grossly normal   Psych: interacts well with caregivers, exhibits logical thought processes and connections, pleasant.      LABS:   None today.     ASSESSMENT:      ICD-10-CM    1. Pneumonia due to COVID-19 virus  U07.1     J12.89    2. Acute respiratory failure with hypoxia (H)  J96.01    3. Fall, subsequent encounter  W19.XXXD        PLAN:    COVID-19 Infection  -COVID-19 PCR positive.   -Albuterol 2 puffs with chamber every 4 hours as needed for shortness of breath or wheeze.   -Eliquis 5 mg by mouth two times a day (this is ongoing d  -Tylenol 650 mg by mouth four times a day for fever, pain as needed.   -Vitamin D3 5000 iu by mouth daily.   -Zince 220 mg by mouth daily.   -Oxygen as needed, plan to wean as much as possible.  -Quarantine.   -Follow very closely due to serious nature of disease and patient's advanced age and comorbidities.    Physical Deconditioning  -Continue PT/OT and other therapies as per care plan.  -Encouraged good nutrition and movement habits.   -Discussed care plan and expected course of stay.   -Continue to follow-up per routine schedule or sooner if needed.     Otherwise continue current care plan for all other chronic medical conditions, as they are stable. Encouraged patient to engage in healthy lifestyle behaviors such as engaging in social activities, exercising (PT/OT), eating well, and following care plan. Follow up for routine check-up, or sooner if needed. Will continue to monitor patient and  work with nursing staff collaboratively to work toward positive patient outcomes.    Electronically signed by: Xiomara Fonseca CNP

## 2021-06-21 NOTE — LETTER
Letter by Jeanie Montez MD at      Author: Jeanie Montez MD Service: -- Author Type: --    Filed:  Encounter Date: 12/21/2020 Status: (Other)         Patient: Eliu Barnes   MR Number: [id number]   YOB: 1927   Date of Visit: 12/21/2020     [facility]    Facility:   [facility] [[id number]]   Code Status: POLST AVAILABLE    Admission evaluation to TCU quarantine unit of 89-year-old male.  History is taken from hospital notes, patient is able to provide minimal history.  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    Past medical history, current medical problem list, drug allergies, current medication list, social history, family history, CODE STATUS reviewed in epic.    Review of systems: Continued sense of dyspnea, moderate cough.  No sputum production.  Denies fever sweats or chills.  Poor appetite continues.  No nausea or vomiting.  No active GI or  symptoms.  Irritable.  No pleuritic chest discomfort.  Remainder of 12 point review of systems obtained negative.    Exam: Sitting in chair, hard of hearing, participates in history taking.  Irritable.  Blood pressure 121/67, heart rate 74, temperature 98.8, O2 96% on 2 L.  No facial asymmetry.  Mucous membranes moist.  No use of accessory muscles at rest.  S1 and S2 regular.  Pulmonary exam with  diminished air movement bases, no rales or wheezes.  Periphery with trace nonpitting edema, venous stasis changes present.  Muscle tone diminished.    Impression and plan:   COVID-19 with pneumonia post remdesivir and dexamethasone, continues to require supplemental O2, continue to observe, inhalers, quarantine to continue, afebrile.    Significant deconditioning with need for rehabilitation.    Vision deficit with macular degeneration.    Chronic DVT on Coumadin to be continued.    Recent ER evaluation with cholelithiasis, asymptomatic, no evidence of acute abdomen.    Chronic bronchiectasis, not on oxygen in home setting.    Severe malnutrition with need for nutritional support.    CT findings of thoracic fracture subacute, no current complaints of pain.    Medical records are reviewed, review of medications, discussion with patient and nursing staff, clinical review of patient.      Electronically signed by: Jeanie Montez MD